# Patient Record
Sex: MALE | Race: WHITE | Employment: STUDENT | URBAN - METROPOLITAN AREA
[De-identification: names, ages, dates, MRNs, and addresses within clinical notes are randomized per-mention and may not be internally consistent; named-entity substitution may affect disease eponyms.]

---

## 2017-06-08 ENCOUNTER — HOSPITAL ENCOUNTER (OUTPATIENT)
Age: 37
Discharge: HOME | End: 2017-06-08

## 2017-06-08 ENCOUNTER — APPOINTMENT (OUTPATIENT)
Dept: GENERAL RADIOLOGY | Age: 37
End: 2017-06-08
Attending: NURSE PRACTITIONER

## 2017-06-08 VITALS
HEIGHT: 75 IN | OXYGEN SATURATION: 96 % | DIASTOLIC BLOOD PRESSURE: 71 MMHG | RESPIRATION RATE: 18 BRPM | TEMPERATURE: 98 F | WEIGHT: 168 LBS | BODY MASS INDEX: 20.89 KG/M2 | HEART RATE: 87 BPM | SYSTOLIC BLOOD PRESSURE: 117 MMHG

## 2017-06-08 DIAGNOSIS — S92.911A CLOSED NONDISPLACED FRACTURE OF PHALANX OF TOE OF RIGHT FOOT, UNSPECIFIED TOE, INITIAL ENCOUNTER: Primary | ICD-10-CM

## 2017-06-08 PROCEDURE — 99203 OFFICE O/P NEW LOW 30 MIN: CPT

## 2017-06-08 PROCEDURE — 73660 X-RAY EXAM OF TOE(S): CPT | Performed by: NURSE PRACTITIONER

## 2017-06-08 PROCEDURE — 28510 TREATMENT OF TOE FRACTURE: CPT

## 2017-06-08 NOTE — ED NOTES
Patient injured his right 5th toe by accidentally hitting his toe on a step ladder. Patient believes it look dislocated. Patient denies any other injures.

## 2017-06-08 NOTE — ED PROVIDER NOTES
Patient presents with:  Lower Extremity Injury (musculoskeletal)      HPI:     Inez Thompson is a 30year old male who presents today with a chief complaint of pain in the right 5th toe pain after twisting injury. Onset of symptoms was today.  There is found for this or any previous visit (from the past 10 hour(s)).     Diagnosis:    ICD-10-CM    1. Closed nondisplaced fracture of phalanx of toe of right foot, unspecified toe, initial encounter S92.911A        All results reviewed and discussed with joe atraumatic, normocephalic, ears, nose and throat are clear  EYES: sclera non icteric bilateral  NECK: supple, no adenopathy, no thyromegaly  LUNGS: clear to auscultation, no RRW  CARDIO: RRR without murmur  GI:  soft, non-tender, normal bowel sounds      M

## (undated) NOTE — ED AVS SNAPSHOT
[facility] in [address] [address] [place]    Phone:  [phone]    Fax:  [phone]           Tara Brar   MRN: [id number]    Department:  [facility] in [address] [place] [address]   Date of Visit: and physician's office to determine coverage and benefits available for follow-up care and referrals. It is our goal to assure that you are completely satisfied with every aspect of your visit today.   In an effort to constantly improve our service to y Any imaging studies and labs completed today can be reviewed in your MyChart account. You may have had testing done that requires us to contact you. Please make sure we have your correct phone number on file.      OUR CURRENT HOURS OF OPERATION:  MONDAY T and ask to get set up for an insurance coverage that is in-network with [employer] [facility]. [employer]     Sign up for [employer], your secure online medical record.   [employer] will allow you to access patient instructions from your recent visit,  view